# Patient Record
Sex: FEMALE | Race: WHITE | Employment: FULL TIME | URBAN - METROPOLITAN AREA
[De-identification: names, ages, dates, MRNs, and addresses within clinical notes are randomized per-mention and may not be internally consistent; named-entity substitution may affect disease eponyms.]

---

## 2017-09-18 ENCOUNTER — AMBULATORY - HEALTHEAST (OUTPATIENT)
Dept: SURGERY | Facility: CLINIC | Age: 42
End: 2017-09-18

## 2017-09-18 DIAGNOSIS — K90.9 UNSPECIFIED INTESTINAL MALABSORPTION: ICD-10-CM

## 2017-09-18 DIAGNOSIS — Z98.84 S/P BARIATRIC SURGERY: ICD-10-CM

## 2017-09-18 DIAGNOSIS — K91.2 OTHER AND UNSPECIFIED POSTSURGICAL NONABSORPTION: ICD-10-CM

## 2017-09-28 ENCOUNTER — AMBULATORY - HEALTHEAST (OUTPATIENT)
Dept: LAB | Facility: CLINIC | Age: 42
End: 2017-09-28

## 2017-09-28 DIAGNOSIS — K91.2 OTHER AND UNSPECIFIED POSTSURGICAL NONABSORPTION: ICD-10-CM

## 2017-09-28 DIAGNOSIS — Z98.84 S/P BARIATRIC SURGERY: ICD-10-CM

## 2017-09-28 DIAGNOSIS — K90.9 UNSPECIFIED INTESTINAL MALABSORPTION: ICD-10-CM

## 2017-10-05 ENCOUNTER — OFFICE VISIT - HEALTHEAST (OUTPATIENT)
Dept: SURGERY | Facility: CLINIC | Age: 42
End: 2017-10-05

## 2017-10-05 DIAGNOSIS — K91.2 POSTOPERATIVE MALABSORPTION: ICD-10-CM

## 2017-10-05 DIAGNOSIS — Z98.84 HX OF GASTRIC BYPASS: ICD-10-CM

## 2017-10-05 ASSESSMENT — MIFFLIN-ST. JEOR: SCORE: 1504.54

## 2017-12-04 ENCOUNTER — COMMUNICATION - HEALTHEAST (OUTPATIENT)
Dept: SURGERY | Facility: CLINIC | Age: 42
End: 2017-12-04

## 2019-11-11 ENCOUNTER — ANCILLARY PROCEDURE (OUTPATIENT)
Dept: MAMMOGRAPHY | Facility: CLINIC | Age: 44
End: 2019-11-11
Payer: COMMERCIAL

## 2019-11-11 DIAGNOSIS — Z12.31 VISIT FOR SCREENING MAMMOGRAM: ICD-10-CM

## 2019-11-11 PROCEDURE — 77067 SCR MAMMO BI INCL CAD: CPT | Mod: TC

## 2021-05-31 VITALS — BODY MASS INDEX: 26.29 KG/M2 | WEIGHT: 154 LBS | HEIGHT: 64 IN

## 2021-06-13 NOTE — PROGRESS NOTES
[facility] Care [facility] Follow Up Visit for Previous Bariatric Surgery   Date of visit: 10/5/2017  Physician: Mihir Rai MD  Primary Care is Yanira Woods MD.  Luis Bautista   41 y.o.  female    Date of Surgery: November, 2011.  Initial Weight: 245 lbs.  Initial BMI: na  Today's Weight:   Wt Readings from Last 1 Encounters:   10/05/17 154 lb (69.9 kg)     Body mass index is 26.43 kg/(m^2).  Initial Weight: 245 lbs  Weight: 154 lb (69.9 kg)  Weight loss from initial: 91  % Weight loss: 37.14 %     Assessment and Plan   Assessment: Luis is a 41 y.o. year old female who is 5 years s/p  Reymundo en Y Gastric Bypass with Dr. Aguila.  She has had a durable weight loss of 91 lbs since surgery.  Overall compliance with the [facility]ariatric Surgery Program has been good.  .  Luis Bautista feels that she has achieved her   preoperative goals for bariatric surgery.    Plan:  1.  Cutting down on grazing/snacking and overuse of artificial sweetener than may be increasing her carb cravings. Trial of probiotic seems reasonable.  2. Continue good vitamin regimen and exercise regimen. Can cut her B12 to 1000mcg daily given slightly elevated levels.  3. Follow up with Dietician given some weight gain, questions regarding adapting Slow Carb diet to bariatric lifestyle.  4. She inquired regarding weight loss medication but given her habit change and BMI below 27, I don't think they are indicated at this time. I will call her in December to check on how things are going.        1. Hx of gastric bypass  Amb Referral to Bariatric Dietician    CANCELED: Amb Referral to Bariatric Dietician   2. Postoperative malabsorption         Return in about 1 year (around 10/5/2018) for I will call her in about 2 months..     Bariatric Surgery Review   Interim History/LifeChanges: new boyfriend and habits so promoting some weight gain.    Patient Concerns: slightly weight gain.    Medication changes: none    Vitamin Intake:  "  Multivitamin   yes   Vitamin D  yes   Calcium  y   Vit. B-12    yes     Habits:            Alcohol Intake  increased but once weekly.   NSAID Use  avoids   Caffeine Use  3 cups daily.   Exercise  yes yoga and [employer].   CPAP Use:  no   Birth Control  nuvaring.                  Symptoms  Hair Loss: No  Reactive Hypoglycemia: No  Abdominal Pain: No  Nausea: No  Heartburn: No  Constipation: Yes  Diarrhea: Yes  Trouble Breathing or Chest Pain: No  Leg Swelling: No  Skin rashes under folds: No                         LABS: \"Reviewed      LABS:  Lab Results   Component Value Date    WBC 4.6 09/28/2017    HGB 13.1 09/28/2017    HCT 39.0 09/28/2017    MCV 91 09/28/2017     09/28/2017      Lab Results   Component Value Date    XBJUHJXA45BF 64.6 09/28/2017    Lab Results   Component Value Date    HGBA1C 5.5 08/16/2011      Lab Results   Component Value Date    CHOL 143 08/16/2011    Lab Results   Component Value Date    PTH 41 09/28/2017         Lab Results   Component Value Date    FERRITIN 113 09/28/2017      Lab Results   Component Value Date    HDL 54 08/16/2011      Lab Results   Component Value Date    OZUACZJJ36 1530 (H) 09/28/2017    Lab Results   Component Value Date    58668 135 09/28/2017      Lab Results   Component Value Date    LDLCALC 61 08/16/2011    Lab Results   Component Value Date    TSH 2.9 08/16/2011    Lab Results   Component Value Date    FOLATE 13.4 09/28/2017      Lab Results   Component Value Date    TRIG 142 08/16/2011    Lab Results   Component Value Date    ALT 25 09/28/2017    AST 21 09/28/2017    ALKPHOS 44 (L) 09/28/2017    BILITOT 0.6 09/28/2017    No results found for: TESTOSTERONE     No components found for: CHOLHDL Lab Results   Component Value Date    7597 74.3 09/28/2017      @marley(vitamin a: 1)@          Patient Profile   Social History     Social History Narrative        Past Medical History   Past Medical History:   Diagnosis Date     Back pain      Back pain      " "Cutaneous skin tags      Foot pain      H/O oral surgery      Hirsutism      Knee pain      Morbid obesity      Obstructive sleep apnea      Restless legs syndrome (RLS)      Secondary hyperparathyroidism      Snoring      Urinary, incontinence, stress female      Vitamin D deficiency      Zinc deficiency      Patient Active Problem List   Diagnosis     Other and unspecified postsurgical nonabsorption     Current Outpatient Prescriptions   Medication Sig Note     calcium citrate-vitamin D3 (CITRACAL + D) 315-250 mg-unit per tablet Take 1 tablet by mouth 3 (three) times a day with meals.      cholecalciferol, vitamin D3, 5,000 unit Tab Take 1 tablet by mouth daily.      cyanocobalamin, vitamin B-12, (VITAMIN B-12) 5,000 mcg Subl Place under the tongue daily.      melatonin 1 mg Tab tablet Take 3 mg by mouth at bedtime as needed.      MULTIVITAMIN W/IRON, MINERALS (MULTIVITAMIN-IRON-MINERALS ORAL) Take 1 tablet by mouth 2 (two) times a day.      NUVARING 0.12-0.015 mg/24 hr vaginal ring Insert 1 each into the vagina every 30 (thirty) days. 4/9/2015: Received from: External Pharmacy Received Sig:        Past Surgical History  She has a past surgical history that includes Reymundo-en-y procedure (11/28/2011) and Cyst Removal (Left, 4/6/2015).     Examination   /55  Pulse 68  Resp 18  Ht 5' 4\" (1.626 m)  Wt 154 lb (69.9 kg)  SpO2 100%  Breastfeeding? No  BMI 26.43 kg/m2  Height: 5' 4\" (1.626 m) (10/5/2017 11:13 AM)  Initial Weight: 245 lbs (10/5/2017 11:13 AM)  Weight: 154 lb (69.9 kg) (10/5/2017 11:13 AM)  Weight loss from initial: 91 (10/5/2017 11:13 AM)  % Weight loss: 37.14 % (10/5/2017 11:13 AM)  BMI (Calculated): 26.4 (10/5/2017 11:13 AM)  SpO2: 100 % (10/5/2017 11:13 AM)  General:  Alert and ambulatory,   HEENT:  No conjunctival pallor, moist mucous Membranes, neck is thin.  Pulmonary:  Normal respiratory effort, no cough, no audible wheezes/crackles.  CV:  Regular rate and Rhythm, no murmurs, pulses " plus  Abdominal: soft, no rashes, flat.  Extremities: no tremor or edema  Skin:  Warm/dry.  Pscyh/Mood: pleasant/happy.         Counseling:   We reviewed the important post op bariatric recommendations:  -eating 3 meals daily  -eating protein first, getting >60gm protein daily  -eating slowly, chewing food well  -avoiding/limiting calorie containing beverages  -drinking water 15-30 minutes before or after meals  -limiting restaurant or cafeteria eating to twice a week or less    We discussed the importance of restorative sleep and stress management in maintaining a healthy weight.  We discussed the National Weight Control Registry healthy weight maintenance strategies and ways to optimize metabolism.  We discussed the importance of physical activity including cardiovascular and strength training in maintaining a healthier weight.  We discussed the importance of life-long vitamin supplementation and life-long  follow-up.    Luis was reminded that, to avoid marginal ulcers she should avoid tobacco at all, alcohol in excess, caffeine in excess, and NSAIDS (unless indicated for cardioprotection or othewise and opposed by a PPI).    At least 25 minutes was spent in direct consultation and over 50% of the time devoted to counseling regarding maximizing the benefits of her previous bariatric surgery while minimizing risks of nutritional or structural complications.    Mihir Rai MD  [facility].  10/5/2017  11:42 AM

## 2021-06-13 NOTE — PROGRESS NOTES
5 yrs post op lab orders placed for patient in preparation for appointment with  in October.    Susanna Leone RN, [facility]  P [phone]  F [phone]

## 2021-08-15 ENCOUNTER — HEALTH MAINTENANCE LETTER (OUTPATIENT)
Age: 46
End: 2021-08-15

## 2021-10-10 ENCOUNTER — HEALTH MAINTENANCE LETTER (OUTPATIENT)
Age: 46
End: 2021-10-10

## 2021-10-19 ENCOUNTER — TELEPHONE (OUTPATIENT)
Dept: SURGERY | Facility: CLINIC | Age: 46
End: 2021-10-19

## 2021-10-19 NOTE — TELEPHONE ENCOUNTER
Sent text link and attempted to call for video visit at 8am. Unable to leave a message due to automated voicemail.

## 2021-10-27 ENCOUNTER — VIRTUAL VISIT (OUTPATIENT)
Dept: SURGERY | Facility: CLINIC | Age: 46
End: 2021-10-27
Payer: COMMERCIAL

## 2021-10-27 DIAGNOSIS — K91.2 POSTOPERATIVE INTESTINAL MALABSORPTION: Primary | ICD-10-CM

## 2021-10-27 DIAGNOSIS — Z71.3 NUTRITIONAL COUNSELING: ICD-10-CM

## 2021-10-27 DIAGNOSIS — Z98.84 S/P GASTRIC BYPASS: ICD-10-CM

## 2021-10-27 PROCEDURE — 97803 MED NUTRITION INDIV SUBSEQ: CPT | Mod: GT | Performed by: DIETITIAN, REGISTERED

## 2021-10-27 NOTE — PROGRESS NOTES
Luis Boswell is a 45 year old who is being evaluated via a billable video visit.        How would you like to obtain your AVS? MyChart  If the video visit is dropped, the invitation should be resent by: Send to e-mail at: [email or web address]  Will anyone else be joining your video visit? No      Video Start Time: 8:19 AM      Post-op Surgical Weight Loss Diet Evaluation     Assessment:  Pt presents for 10 years post-op RD visit, s/p RNY GB on 11/2011 with Dr. Aguila. Today we reviewed current eating habits and level of physical activity, and instructed on the changes that are required for successful bariatric outcomes.    Patient Progress: did start Phentermine with Dr Ball due to weight regain about 1 month ago, which has helped with portion control     Initial weight: 245 lbs   Current weight: 176 lbs   Weight change: down 6 lbs since visit with Dr Ball.    There is no height or weight on file to calculate BMI.    Patient Active Problem List   Diagnosis     Other and unspecified postsurgical nonabsorption       Vitamins   Multi Vit with Iron: yes  Calcium Citrate: yes  B12: yes  D3: yes    Diet Recall/Time:   Breakfast: eggs, piece of toast or oatmeal with blueberries, avendano or yogurt with fruit and nuts, piece of toast (Keto Bread)  Lunch: leftovers from the night before or frozen meal or cheese and crackers, apple or cheese, veggies and dip   Dinner: crock pot or instant pot meals-pot roast or pork roast or soups or chicken or grilled meats, veggies     Typical Snacks: nuts or popcorn     Proteins/Veg/Fruits/CHO (NOT well tolerated): high fat food    Estimated protein intake: 60+ grams    Estimated portion size per meals:1-1.5 cup/meal (prior to Phentermine, was eating at least 2 cups food/meal)    Meals per week away from home: 0-1 time/week     Meal Duration:20 minutes, on occasion has eaten a meal too quickly    Fluid-meal separation:  Fluids are  30min before and 30 minutes after  "meals.    Fluid Intake  Water: including Crystal Light/Flavored Water-\"not enough\"  Caffeine: 2 cups of coffee, Tea/Iced Tea (Unsweetened)   Alcohol: occasional glass of wine  Carbonation: Sparkling Ice 2 times/week    Exercise: no set regimen, occasional yoga or walking/hiking      PES statement:      Overweight/Obesity (NC 3.3) related to overeating and poor lifestyle habits as evidenced by patient's subjective statements (h/o excessive energy intake due to portion control, lack of exercise) and BMI of 34.4 kg/m2.     Intervention    Discussion  1. Recommended to consume 15-20gm protein at 3 meals daily, along with protein supplement/\"planned protein containing snack\" of 15-30gm protein, to reach goal of 60-80 gm protein daily.  2. Educated on post-op vitamin regimen: Multi Vit + iron 2x/day, calcium citrate 400-600 mg 2x/day, 3195-5887 mcg of Sublingual B-12 daily, and 5000 IU Vitamin D3 daily (MVI and calcium can be taken at the same time BID)  3. Reviewed lean protein sources  4. Bariatric Plate Method-  including lean/low fat protein at each meal, including a vegetable/fruit, and limiting carbohydrate intake to less than 25% of plate volume.     Instructions  1. Include 15-20gm protein at each meal, along with protein supplement/\"planned protein containing snack\" of 15-30gm protein, to reach goal of 60-80 gm protein daily.  2. Increase fluid intake to 64oz daily: choose plain or calorie/carbonation-free beverages.  3. Incorporate daily structured activity, 30-60 minutes most days of the week  4. Recommended pt to start taking: Multi Vit + iron 2x/day, calcium citrate 400-600 mg 2x/day, 4568-7028 mcg of Sublingual B-12 daily, and 5000 IU Vitamin D3 daily. (MVI and calcium can be taken at the same time)  5. Read food labels more consistently: keeping total fat grams <10, total sugar grams <10, fiber >3gm per serving.  6. Increase vegetable/fruit intake, by having a vegetable or fruit with each meal " daily.  7. Practice plate method: 1/2 plate lean/low fat protein source, vegetable/fruit, <25% of plate complex carbohydrates.  8. Separate fluids 30 minutes before/after meal times.  9. Practice eating off of smaller plates/bowls, chewing to applesauce consistency, taking 20-30 minutes to eat in a calm/relaxed environment without distractions of tv/email/cell phone.    Handouts provided:  None     Assessment/Plan:    Pt to follow up in 1 month with bariatrician       Video-Visit Details    Type of service:  Video Visit    Video End Time:8:50 AM    Originating Location (pt. Location): Home    Distant Location (provider location):  [facility] SURGERY CLINIC AND BARIATRICS CARE [place]     Platform used for Video Visit: Bjorn Hui RD

## 2021-10-27 NOTE — LETTER
10/27/2021         RE: Luis Boswell  [address]  [facility] [address]        Dear Colleague,    Thank you for referring your patient, Luis Boswell, to the [facility] SURGERY CLINIC [facility] BARIATRICS CARE [place]. Please see a copy of my visit note below.    Luis Boswell is a 45 year old who is being evaluated via a billable video visit.        How would you like to obtain your AVS? MyChart  If the video visit is dropped, the invitation should be resent by: Send to e-mail at: [email or web address]  Will anyone else be joining your video visit? No      Video Start Time: 8:19 AM      Post-op Surgical Weight Loss Diet Evaluation     Assessment:  Pt presents for 10 years post-op RD visit, s/p RNY SHELBY on 11/2011 with Dr. Aguila. Today we reviewed current eating habits and level of physical activity, and instructed on the changes that are required for successful bariatric outcomes.    Patient Progress: did start Phentermine with Dr Ball due to weight regain about 1 month ago, which has helped with portion control     Initial weight: 245 lbs   Current weight: 176 lbs   Weight change: down 6 lbs since visit with Dr Ball.    There is no height or weight on file to calculate BMI.    Patient Active Problem List   Diagnosis     Other and unspecified postsurgical nonabsorption       Vitamins   Multi Vit with Iron: yes  Calcium Citrate: yes  B12: yes  D3: yes    Diet Recall/Time:   Breakfast: eggs, piece of toast or oatmeal with blueberries, avendano or yogurt with fruit and nuts, piece of toast (Keto Bread)  Lunch: leftovers from the night before or frozen meal or cheese and crackers, apple or cheese, veggies and dip   Dinner: crock pot or instant pot meals-pot roast or pork roast or soups or chicken or grilled meats, veggies     Typical Snacks: nuts or popcorn     Proteins/Veg/Fruits/CHO (NOT well tolerated): high fat food    Estimated protein intake: 60+ grams    Estimated portion size per meals:1-1.5 cup/meal  "(prior to Phentermine, was eating at least 2 cups food/meal)    Meals per week away from home: 0-1 time/week     Meal Duration:20 minutes, on occasion has eaten a meal too quickly    Fluid-meal separation:  Fluids are  30min before and 30 minutes after meals.    Fluid Intake  Water: including Crystal Light/Flavored Water-\"not enough\"  Caffeine: 2 cups of coffee, Tea/Iced Tea (Unsweetened)   Alcohol: occasional glass of wine  Carbonation: Sparkling Ice 2 times/week    Exercise: no set regimen, occasional yoga or walking/hiking      PES statement:      Overweight/Obesity (NC 3.3) related to overeating and poor lifestyle habits as evidenced by patient's subjective statements (h/o excessive energy intake due to portion control, lack of exercise) and BMI of 34.4 kg/m2.     Intervention    Discussion  1. Recommended to consume 15-20gm protein at 3 meals daily, along with protein supplement/\"planned protein containing snack\" of 15-30gm protein, to reach goal of 60-80 gm protein daily.  2. Educated on post-op vitamin regimen: Multi Vit + iron 2x/day, calcium citrate 400-600 mg 2x/day, 6792-9937 mcg of Sublingual B-12 daily, and 5000 IU Vitamin D3 daily (MVI and calcium can be taken at the same time BID)  3. Reviewed lean protein sources  4. Bariatric Plate Method-  including lean/low fat protein at each meal, including a vegetable/fruit, and limiting carbohydrate intake to less than 25% of plate volume.     Instructions  1. Include 15-20gm protein at each meal, along with protein supplement/\"planned protein containing snack\" of 15-30gm protein, to reach goal of 60-80 gm protein daily.  2. Increase fluid intake to 64oz daily: choose plain or calorie/carbonation-free beverages.  3. Incorporate daily structured activity, 30-60 minutes most days of the week  4. Recommended pt to start taking: Multi Vit + iron 2x/day, calcium citrate 400-600 mg 2x/day, 1569-0827 mcg of Sublingual B-12 daily, and 5000 IU Vitamin D3 " daily. (MVI and calcium can be taken at the same time)  5. Read food labels more consistently: keeping total fat grams <10, total sugar grams <10, fiber >3gm per serving.  6. Increase vegetable/fruit intake, by having a vegetable or fruit with each meal daily.  7. Practice plate method: 1/2 plate lean/low fat protein source, vegetable/fruit, <25% of plate complex carbohydrates.  8. Separate fluids 30 minutes before/after meal times.  9. Practice eating off of smaller plates/bowls, chewing to applesauce consistency, taking 20-30 minutes to eat in a calm/relaxed environment without distractions of tv/email/cell phone.    Handouts provided:  None     Assessment/Plan:    Pt to follow up in 1 month with bariatrician       Video-Visit Details    Type of service:  Video Visit    Video End Time:8:50 AM    Originating Location (pt. Location): Home    Distant Location (provider location):  [facility] SUR[facility]ERY CL[facility]IC AND BARIATRICS CARE [place]     Platform used for Video Visit: Bjorn Hui RD          Again, thank you for allowing me to participate in the care of your patient.        Sincerely,        Loyda Hui RD

## 2021-11-30 ENCOUNTER — VIRTUAL VISIT (OUTPATIENT)
Dept: SURGERY | Facility: CLINIC | Age: 46
End: 2021-11-30
Payer: COMMERCIAL

## 2021-11-30 VITALS — HEIGHT: 63 IN | BODY MASS INDEX: 30.9 KG/M2 | WEIGHT: 174.4 LBS

## 2021-11-30 DIAGNOSIS — K91.2 POSTOPERATIVE INTESTINAL MALABSORPTION: Primary | ICD-10-CM

## 2021-11-30 PROCEDURE — 99214 OFFICE O/P EST MOD 30 MIN: CPT | Mod: 95 | Performed by: FAMILY MEDICINE

## 2021-11-30 RX ORDER — MULTIVIT-MIN/FOLIC ACID/BIOTIN 400-400MCG
2 CAPSULE ORAL
COMMUNITY
Start: 2021-09-22

## 2021-11-30 ASSESSMENT — MIFFLIN-ST. JEOR: SCORE: 1415.2

## 2021-11-30 NOTE — PROGRESS NOTES
Luis Boswell is 45 year old  female who presents for a billable video visit today.    How would you like to obtain your AVS? MyChart  If dropped from the video visit, the video invitation should be resent by: Send to e-mail at: [email or web address]  Will anyone else be joining your video visit? No      Video Start Time: 9:30      Bariatric Follow Up Visit with a History of Previous Bariatric Surgery     Date of visit: 11/30/2021  Physician: Cris Ball MD, MD  Primary Care Provider:  Isabel Poon LEAH Boswell   45 year old  female    Date of Surgery: 11/2011  Initial Weight: 245#  Initial BMI:   Today's Weight:   Wt Readings from Last 1 Encounters:   11/30/21 79.1 kg (174 lb 6.4 oz)     Body mass index is 30.89 kg/m .      Assessment and Plan     Assessment: Luis is a 45 year old year old female who is 10 yrs s/p  Reymundo en Y Gastric Bypass with Dr. Aguila  Luis Boswell feels as if she has achieved the goals she hoped to accomplish through bariatric surgery and weight loss.    Encounter Diagnosis   Name Primary?     Postoperative intestinal malabsorption Yes         Current Outpatient Medications:      calcium citrate-vitamin D3 (CITRACAL + D) 315-250 mg-unit per tablet, [CALCIUM CITRATE-VITAMIN D3 (CITRACAL + D) 315-250 MG-UNIT PER TABLET] Take 1 tablet by mouth 3 (three) times a day with meals., Disp: , Rfl:      cholecalciferol, vitamin D3, 5,000 unit Tab, [CHOLECALCIFEROL, VITAMIN D3, 5,000 UNIT TAB] Take 1 tablet by mouth daily., Disp: , Rfl:      cyanocobalamin, vitamin B-12, (VITAMIN B-12) 5,000 mcg Subl, [CYANOCOBALAMIN, VITAMIN B-12, (VITAMIN B-12) 5,000 MCG SUBL] Place under the tongue daily., Disp: , Rfl:      MEDICATION CANNOT BE REORDERED - PLEASE MANUALLY REORDER AND DISCONTINUE THE OLD ORDER, [MULTIVITAMIN W/IRON, MINERALS (MULTIVITAMIN-IRON-MINERALS ORAL)] Take 1 tablet by mouth 2 (two) times a day., Disp: , Rfl:      NUVARING 0.12-0.015 mg/24 hr vaginal ring, [NUVARING  "0.12-0.015 MG/24 HR VAGINAL RING] Insert 1 each into the vagina every 30 (thirty) days., Disp: , Rfl:      phentermine (ADIPEX-P) 37.5 MG tablet, Take 0.5-1 tablets (18.75-37.5 mg) by mouth every morning (before breakfast), Disp: 90 tablet, Rfl: 0     Specialty Vitamins Products (VITAMINS FOR HAIR) CAPS, Take 2 tablets by mouth, Disp: , Rfl:     Plan: Continue vitamins as is. Continue phentermine-taking it in the morning. Protect your exercise.     No follow-ups on file.    Bariatric Surgery Review     Interim History/LifeChanges: Doing well. Maintaining a 71# weight loss. Likes the phentermine. Recent struggles with her daughter and her 's grandmother    Patient Concerns: Labs. Follow Up. Phentermine.  Appetite (1-10): improved with phentermine  GERD: no    Medication changes: none    Vitamin Intake:   B-12   SL   MVI  2/d   Vitamin D  5,000   Calcium   dietary and      Other  citrate              LABS: \"Reviewed  Excellent   Nausea no  Vomiting no  Constipation no  Diarrhea no  Rashes no  Hair Loss no  Calf tenderness no  Breathing difficulty no  Reactive Hypoglycemia avoids  Light Headedness no   Moods euthymic    12 point ROS as above and otherwise negative      Habits:  Alcohol: wine 1-2/wk  Tobacco: no  Caffeine 2c/d  NSAIDS no  Exercise Routine: will walk on the treadmill 3X/wk. Has done yoga  3 meals/day yes  Protein first yes  60grams/day  Water Separate from meals yes  Calorie Containing Beverages no  Restaurant eating/wk 0-1  Sleeping well  Stress moderate  CPAP: NA  Contraception: nuvaring  DEXA:not indicated for age <45    Social History     Social History     Socioeconomic History     Marital status:      Spouse name: Not on file     Number of children: Not on file     Years of education: Not on file     Highest education level: Not on file   Occupational History     Not on file   Tobacco Use     Smoking status: Never Smoker     Smokeless tobacco: Never Used   Substance and Sexual " Activity     Alcohol use: Yes     Comment: Alcoholic Drinks/day: Occasionally/holidays     Drug use: No     Sexual activity: Yes     Partners: Male     Birth control/protection: Inserts   Other Topics Concern     Not on file   Social History Narrative     Not on file     Social Determinants of Health     Financial Resource Strain: Not on file   Food Insecurity: Not on file   Transportation Needs: Not on file   Physical Activity: Not on file   Stress: Not on file   Social Connections: Not on file   Intimate Partner Violence: Not on file   Housing Stability: Not on file       Past Medical History     History reviewed. No pertinent past medical history.  Problem List     Patient Active Problem List   Diagnosis     Other and unspecified postsurgical nonabsorption     Medications       Current Outpatient Medications:      calcium citrate-vitamin D3 (CITRACAL + D) 315-250 mg-unit per tablet, [CALCIUM CITRATE-VITAMIN D3 (CITRACAL + D) 315-250 MG-UNIT PER TABLET] Take 1 tablet by mouth 3 (three) times a day with meals., Disp: , Rfl:      cholecalciferol, vitamin D3, 5,000 unit Tab, [CHOLECALCIFEROL, VITAMIN D3, 5,000 UNIT TAB] Take 1 tablet by mouth daily., Disp: , Rfl:      cyanocobalamin, vitamin B-12, (VITAMIN B-12) 5,000 mcg Subl, [CYANOCOBALAMIN, VITAMIN B-12, (VITAMIN B-12) 5,000 MCG SUBL] Place under the tongue daily., Disp: , Rfl:      MEDICATION CANNOT BE REORDERED - PLEASE MANUALLY REORDER AND DISCONTINUE THE OLD ORDER, [MULTIVITAMIN W/IRON, MINERALS (MULTIVITAMIN-IRON-MINERALS ORAL)] Take 1 tablet by mouth 2 (two) times a day., Disp: , Rfl:      NUVARING 0.12-0.015 mg/24 hr vaginal ring, [NUVARING 0.12-0.015 MG/24 HR VAGINAL RING] Insert 1 each into the vagina every 30 (thirty) days., Disp: , Rfl:      phentermine (ADIPEX-P) 37.5 MG tablet, Take 0.5-1 tablets (18.75-37.5 mg) by mouth every morning (before breakfast), Disp: 90 tablet, Rfl: 0     Specialty Vitamins Products (VITAMINS FOR HAIR) CAPS, Take 2 tablets  "by mouth, Disp: , Rfl:    Surgical History     Past Surgical History  She has a past surgical history that includes Revision Reymundo-En-Y (11/28/2011) and Cyst Removal (Left, 4/6/2015).    Objective-Exam     Constitutional:  Ht 1.6 m (5' 3\")   Wt 79.1 kg (174 lb 6.4 oz)   BMI 30.89 kg/m    General:  Pleasant and in no acute distress     Psychiatric: alert and oriented X3, mood and affect normal    Counseling     We reviewed the important post op bariatric recommendations:  -eating 3 meals daily  -eating protein first, getting >60gm protein daily  -eating slowly, chewing food well  -avoiding/limiting calorie containing beverages  -drinking water 15-30 minutes before or after meals  -choosing wheat, not white with breads, crackers, pastas, adwoa, bagels, tortillas, rice  -limiting restaurant or cafeteria eating to twice a week or less    We discussed the importance of restorative sleep and stress management in maintaining a healthy weight.  We discussed the National Weight Control Registry healthy weight maintenance strategies and ways to optimize metabolism.  We discussed the importance of physical activity including cardiovascular and strength training in maintaining a healthier weight.    We discussed the importance of life-long vitamin supplementation and life-long  follow-up.    Luis was reminded that, to avoid marginal ulcers she should avoid tobacco at all, alcohol in excess, caffeine in excess, and NSAIDS (unless indicated for cardioprotection or othewise and opposed by a PPI).    Cris Ball MD, MD, [facility].  11/30/2021  9:38 AM  Total time spent on the date of this encounter doing: chart review, review of test results, patient visit, physical exam, education, counseling, developing plan of care, and documenting = 30 minutes.      Video-Visit Details    Type of service:  Video Visit    Video End Time (time video stopped): 10:00 AM  Originating Location (pt. " Location): Home    Distant Location (provider location):  [facility] [place]     Platform used for Video Visit: Bjorn

## 2021-11-30 NOTE — LETTER
11/30/2021         RE: Luis Boswell  [address]  [place] [address]        Dear Colleague,    Thank you for referring your patient, Luis Boswell, to the [facility] [place]. Please see a copy of my visit note below.    Luis Boswell is 45 year old  female who presents for a billable video visit today.    How would you like to obtain your AVS? MyChart  If dropped from the video visit, the video invitation should be resent by: Send to e-mail at: [email or web address]  Will anyone else be joining your video visit? No      Video Start Time: 9:30      Bariatric Follow Up Visit with a History of Previous Bariatric Surgery     Date of visit: 11/30/2021  Physician: Cris Ball MD, MD  Primary Care Provider:  Isabel Poon LEAH Andreia   45 year old  female    Date of Surgery: 11/2011  Initial Weight: 245#  Initial BMI:   Today's Weight:   Wt Readings from Last 1 Encounters:   11/30/21 79.1 kg (174 lb 6.4 oz)     Body mass index is 30.89 kg/m .      Assessment and Plan     Assessment: Luis is a 45 year old year old female who is 10 yrs s/p  Reymundo en Y Gastric Bypass with Dr. Aguila  Luis Boswell feels as if she has achieved the goals she hoped to accomplish through bariatric surgery and weight loss.    Encounter Diagnosis   Name Primary?     Postoperative intestinal malabsorption Yes         Current Outpatient Medications:      calcium citrate-vitamin D3 (CITRACAL + D) 315-250 mg-unit per tablet, [CALCIUM CITRATE-VITAMIN D3 (CITRACAL + D) 315-250 MG-UNIT PER TABLET] Take 1 tablet by mouth 3 (three) times a day with meals., Disp: , Rfl:      cholecalciferol, vitamin D3, 5,000 unit Tab, [CHOLECALCIFEROL, VITAMIN D3, 5,000 UNIT TAB] Take 1 tablet by mouth daily., Disp: , Rfl:      cyanocobalamin, vitamin B-12, (VITAMIN B-12) 5,000 mcg Subl, [CYANOCOBALAMIN, VITAMIN B-12, (VITAMIN B-12) 5,000 MCG SUBL] Place under the tongue daily., Disp: , Rfl:       "MEDICATION CANNOT BE REORDERED - PLEASE MANUALLY REORDER AND DISCONTINUE THE OLD ORDER, [MULTIVITAMIN W/IRON, MINERALS (MULTIVITAMIN-IRON-MINERALS ORAL)] Take 1 tablet by mouth 2 (two) times a day., Disp: , Rfl:      NUVARING 0.12-0.015 mg/24 hr vaginal ring, [NUVARING 0.12-0.015 MG/24 HR VAGINAL RING] Insert 1 each into the vagina every 30 (thirty) days., Disp: , Rfl:      phentermine (ADIPEX-P) 37.5 MG tablet, Take 0.5-1 tablets (18.75-37.5 mg) by mouth every morning (before breakfast), Disp: 90 tablet, Rfl: 0     Specialty Vitamins Products (VITAMINS FOR HAIR) CAPS, Take 2 tablets by mouth, Disp: , Rfl:     Plan: Continue vitamins as is. Continue phentermine-taking it in the morning. Protect your exercise.     No follow-ups on file.    Bariatric Surgery Review     Interim History/LifeChanges: Doing well. Maintaining a 71# weight loss. Likes the phentermine. Recent struggles with her daughter and her 's grandmother    Patient Concerns: Labs. Follow Up. Phentermine.  Appetite (1-10): improved with phentermine  GERD: no    Medication changes: none    Vitamin Intake:   B-12   SL   MVI  2/d   Vitamin D  5,000   Calcium   dietary and      Other  citrate              LABS: \"Reviewed  Excellent   Nausea no  Vomiting no  Constipation no  Diarrhea no  Rashes no  Hair Loss no  Calf tenderness no  Breathing difficulty no  Reactive Hypoglycemia avoids  Light Headedness no   Moods euthymic    12 point ROS as above and otherwise negative      Habits:  Alcohol: wine 1-2/wk  Tobacco: no  Caffeine 2c/d  NSAIDS no  Exercise Routine: will walk on the treadmill 3X/wk. Has done yoga  3 meals/day yes  Protein first yes  60grams/day  Water Separate from meals yes  Calorie Containing Beverages no  Restaurant eating/wk 0-1  Sleeping well  Stress moderate  CPAP: NA  Contraception: nuvaring  DEXA:not indicated for age <45    Social History     Social History     Socioeconomic History     Marital status:      Spouse name: Not " on file     Number of children: Not on file     Years of education: Not on file     Highest education level: Not on file   Occupational History     Not on file   Tobacco Use     Smoking status: Never Smoker     Smokeless tobacco: Never Used   Substance and Sexual Activity     Alcohol use: Yes     Comment: Alcoholic Drinks/day: Occasionally/holidays     Drug use: No     Sexual activity: Yes     Partners: Male     Birth control/protection: Inserts   Other Topics Concern     Not on file   Social History Narrative     Not on file     Social Determinants of Health     Financial Resource Strain: Not on file   Food Insecurity: Not on file   Transportation Needs: Not on file   Physical Activity: Not on file   Stress: Not on file   Social Connections: Not on file   Intimate Partner Violence: Not on file   Housing Stability: Not on file       Past Medical History     History reviewed. No pertinent past medical history.  Problem List     Patient Active Problem List   Diagnosis     Other and unspecified postsurgical nonabsorption     Medications       Current Outpatient Medications:      calcium citrate-vitamin D3 (CITRACAL + D) 315-250 mg-unit per tablet, [CALCIUM CITRATE-VITAMIN D3 (CITRACAL + D) 315-250 MG-UNIT PER TABLET] Take 1 tablet by mouth 3 (three) times a day with meals., Disp: , Rfl:      cholecalciferol, vitamin D3, 5,000 unit Tab, [CHOLECALCIFEROL, VITAMIN D3, 5,000 UNIT TAB] Take 1 tablet by mouth daily., Disp: , Rfl:      cyanocobalamin, vitamin B-12, (VITAMIN B-12) 5,000 mcg Subl, [CYANOCOBALAMIN, VITAMIN B-12, (VITAMIN B-12) 5,000 MCG SUBL] Place under the tongue daily., Disp: , Rfl:      MEDICATION CANNOT BE REORDERED - PLEASE MANUALLY REORDER AND DISCONTINUE THE OLD ORDER, [MULTIVITAMIN W/IRON, MINERALS (MULTIVITAMIN-IRON-MINERALS ORAL)] Take 1 tablet by mouth 2 (two) times a day., Disp: , Rfl:      NUVARING 0.12-0.015 mg/24 hr vaginal ring, [NUVARING 0.12-0.015 MG/24 HR VAGINAL RING] Insert 1 each into the  "vagina every 30 (thirty) days., Disp: , Rfl:      phentermine (ADIPEX-P) 37.5 MG tablet, Take 0.5-1 tablets (18.75-37.5 mg) by mouth every morning (before breakfast), Disp: 90 tablet, Rfl: 0     Specialty Vitamins Products (VITAMINS FOR HAIR) CAPS, Take 2 tablets by mouth, Disp: , Rfl:    Surgical History     Past Surgical History  She has a past surgical history that includes Revision Reymundo-En-Y (11/28/2011) and Cyst Removal (Left, 4/6/2015).    Objective-Exam     Constitutional:  Ht 1.6 m (5' 3\")   Wt 79.1 kg (174 lb 6.4 oz)   BMI 30.89 kg/m    General:  Pleasant and in no acute distress     Psychiatric: alert and oriented X3, mood and affect normal    Counseling     We reviewed the important post op bariatric recommendations:  -eating 3 meals daily  -eating protein first, getting >60gm protein daily  -eating slowly, chewing food well  -avoiding/limiting calorie containing beverages  -drinking water 15-30 minutes before or after meals  -choosing wheat, not white with breads, crackers, pastas, adwoa, bagels, tortillas, rice  -limiting restaurant or cafeteria eating to twice a week or less    We discussed the importance of restorative sleep and stress management in maintaining a healthy weight.  We discussed the National Weight Control Registry healthy weight maintenance strategies and ways to optimize metabolism.  We discussed the importance of physical activity including cardiovascular and strength training in maintaining a healthier weight.    We discussed the importance of life-long vitamin supplementation and life-long  follow-up.    Luis was reminded that, to avoid marginal ulcers she should avoid tobacco at all, alcohol in excess, caffeine in excess, and NSAIDS (unless indicated for cardioprotection or othewise and opposed by a PPI).    Cris Ball MD, MD, [facility]  [facility].  11/30/2021  9:38 AM  Total time spent on the date of this encounter doing: chart review, review " of test results, patient visit, physical exam, education, counseling, developing plan of care, and documenting = 30 minutes.      Video-Visit Details    Type of service:  Video Visit    Video End Time (time video stopped): 10:00 AM  Originating Location (pt. Location): Home    Distant Location (provider location):  [facility]     Platform used for Video Visit: [employer]      Again, thank you for allowing me to participate in the care of your patient.        Sincerely,        Cris Ball MD

## 2022-01-04 ENCOUNTER — TELEPHONE (OUTPATIENT)
Dept: SURGERY | Facility: CLINIC | Age: 47
End: 2022-01-04
Payer: COMMERCIAL

## 2022-01-04 NOTE — TELEPHONE ENCOUNTER
Prior Authorization Retail Medication Request    Medication/Dose: Phentermine 37.5 mg  ICD code (if different than what is on RX):    Previously Tried and Failed:    Rationale:      Insurance Name:  [employer]  Insurance ID:  [id number]    Key: [id number]    Pharmacy Information (if different than what is on RX)  Name:  [facility] [place]  Phone:  [phone]

## 2022-01-05 NOTE — TELEPHONE ENCOUNTER
Central Prior Authorization Team   Phone: [phone]    PA Initiation    Medication: Phentermine 37.5 mg  Insurance Company: [employer] - Phone [phone] Fax [phone]  Pharmacy Filling the Rx: [id number] - [place], [place] - [address]  Filling Pharmacy Phone: [phone]  Filling Pharmacy Fax:    Start Date: 1/5/2022

## 2022-01-07 NOTE — TELEPHONE ENCOUNTER
Prior Authorization Approval    Authorization Effective Date: 12/6/2021  Authorization Expiration Date: 1/5/2023  Medication: Phentermine 37.5 mg  Approved Dose/Quantity:    Reference #:     Insurance Company: [employer] - Phone [phone] Fax [phone]  Expected CoPay:       CoPay Card Available:      Foundation Assistance Needed:    Which Pharmacy is filling the prescription (Not needed for infusion/clinic administered): [id number] - [place], [place] - [address] [facility]  Pharmacy Notified: Yes  Patient Notified: Yes

## 2022-09-18 ENCOUNTER — HEALTH MAINTENANCE LETTER (OUTPATIENT)
Age: 47
End: 2022-09-18

## 2023-01-29 ENCOUNTER — HEALTH MAINTENANCE LETTER (OUTPATIENT)
Age: 48
End: 2023-01-29

## 2023-10-08 ENCOUNTER — HEALTH MAINTENANCE LETTER (OUTPATIENT)
Age: 48
End: 2023-10-08

## 2024-12-01 ENCOUNTER — HEALTH MAINTENANCE LETTER (OUTPATIENT)
Age: 49
End: 2024-12-01